# Patient Record
Sex: FEMALE | Race: WHITE | URBAN - METROPOLITAN AREA
[De-identification: names, ages, dates, MRNs, and addresses within clinical notes are randomized per-mention and may not be internally consistent; named-entity substitution may affect disease eponyms.]

---

## 2017-01-18 DIAGNOSIS — E78.5 DYSLIPIDEMIA: ICD-10-CM

## 2017-01-18 RX ORDER — MECOBAL/LEVOMEFOLAT CA/B6 PHOS 2-3-35 MG
TABLET ORAL
Qty: 180 TABLET | Refills: 1 | Status: SHIPPED | OUTPATIENT
Start: 2017-01-18 | End: 2017-04-13 | Stop reason: SDUPTHER

## 2017-04-13 ENCOUNTER — OFFICE VISIT (OUTPATIENT)
Dept: ENDOCRINOLOGY | Age: 57
End: 2017-04-13

## 2017-04-13 VITALS
RESPIRATION RATE: 16 BRPM | BODY MASS INDEX: 31.42 KG/M2 | SYSTOLIC BLOOD PRESSURE: 130 MMHG | DIASTOLIC BLOOD PRESSURE: 68 MMHG | HEIGHT: 61 IN | HEART RATE: 65 BPM | OXYGEN SATURATION: 100 % | WEIGHT: 166.4 LBS

## 2017-04-13 DIAGNOSIS — Z15.89 MTHFR MUTATION: ICD-10-CM

## 2017-04-13 DIAGNOSIS — Z86.718 PERSONAL HISTORY OF DVT (DEEP VEIN THROMBOSIS): ICD-10-CM

## 2017-04-13 DIAGNOSIS — E61.1 IRON DEFICIENCY: ICD-10-CM

## 2017-04-13 DIAGNOSIS — E11.9 TYPE 2 DIABETES MELLITUS WITHOUT COMPLICATION, WITHOUT LONG-TERM CURRENT USE OF INSULIN (HCC): ICD-10-CM

## 2017-04-13 DIAGNOSIS — N95.1 MENOPAUSAL STATE: ICD-10-CM

## 2017-04-13 DIAGNOSIS — E78.5 DYSLIPIDEMIA: ICD-10-CM

## 2017-04-13 DIAGNOSIS — E28.2 PCOS (POLYCYSTIC OVARIAN SYNDROME): Primary | ICD-10-CM

## 2017-04-13 LAB
25-HYDROXYVITAMIN D2 AND D3: 43 NG/ML (ref 30–80)
BASOPHILS ABSOLUTE: 0.1 10*3/UL (ref 0–0.2)
BASOPHILS RELATIVE PERCENT: 0.8 %
C-PEPTIDE: 3.21 NG/ML (ref 0.6–3.9)
CHOLESTEROL, TOTAL: 197 MG/DL (ref 125–199)
CHOLESTEROL/HDL RATIO: 2.5 (ref 0–5)
CORTISOL TOTAL: 6.1 UG/DL
CREATININE URINE: 75.9 MG/DL (ref 47–110)
DHEAS (DHEA SULFATE): 226.3 UG/DL (ref 29.7–182.2)
EOSINOPHILS ABSOLUTE: 0.3 10*3/UL (ref 0–0.5)
EOSINOPHILS RELATIVE PERCENT: 2.7 %
ESTIMATED AVERAGE GLUCOSE: 143 MG/DL (ref 68–114)
ESTRADIOL, SENSITIVE: <10 PG/ML
FERRITIN: 43 NG/ML (ref 10–120)
FOLLICLE STIMULATING HORMONE: 74.2 MIU/ML
GRANULOCYTE ABSOLUTE COUNT: 5.4 10*3/UL (ref 1.5–7.8)
HBA1C MFR BLD: 6.6 % (ref 4–5.6)
HCT VFR BLD CALC: 41.5 % (ref 35–45)
HDLC SERPL-MCNC: 80 MG/DL (ref 40–180)
HEMOGLOBIN: 13.7 G/DL (ref 11.7–15.5)
IRON: 64 UG/DL (ref 50–170)
LDL CHOLESTEROL CALCULATED: 90 MG/DL (ref 0–100)
LYMPHOCYTES ABSOLUTE: 3.3 10*3/UL (ref 0.8–3.9)
LYMPHOCYTES RELATIVE PERCENT: 34.5 %
MCH RBC QN AUTO: 28.5 PG (ref 27–33)
MCHC RBC AUTO-ENTMCNC: 33 G/DL (ref 32–36)
MCV RBC AUTO: 86.4 FL (ref 80–100)
MICROALBUMIN/CREAT 24H UR: 0.3 MG/DL
MICROALBUMIN/CREAT UR-RTO: 4 MG/G CREAT (ref 0–30)
MONOCYTES ABSOLUTE: 0.5 10*3/UL (ref 0.2–0.9)
MONOCYTES RELATIVE PERCENT: 5.3 %
O2 SATURATION: 20 % (ref 15–50)
PDW BLD-RTO: 13.5 % (ref 11–15)
PLATELET # BLD: 403 10*3/UL (ref 140–400)
PMV BLD AUTO: 7.1 FL (ref 7.5–11.5)
RBC # BLD: 4.8 10*6/UL (ref 3.8–5.1)
SEGMENTED NEUTROPHILS RELATIVE PERCENT: 56.7 %
T4 FREE: 1 NG/DL (ref 0.8–1.8)
TOTAL IRON BINDING CAPACITY: 327 UG/DL (ref 250–450)
TRIGL SERPL-MCNC: 134 MG/DL (ref 0–150)
TSH ULTRASENSITIVE: 1.36 UIU/ML (ref 0.35–4.94)
WBC # BLD: 9.6 10*3/UL (ref 3.8–10.8)

## 2017-04-13 PROCEDURE — 99214 OFFICE O/P EST MOD 30 MIN: CPT | Performed by: INTERNAL MEDICINE

## 2017-04-13 RX ORDER — ERGOCALCIFEROL 1.25 MG/1
50000 CAPSULE ORAL WEEKLY
Qty: 12 CAPSULE | Refills: 1 | Status: SHIPPED | OUTPATIENT
Start: 2017-04-13

## 2017-04-13 RX ORDER — METFORMIN HYDROCHLORIDE 750 MG/1
TABLET, EXTENDED RELEASE ORAL
Qty: 90 TABLET | Refills: 2 | Status: SHIPPED | OUTPATIENT
Start: 2017-04-13 | End: 2017-04-21

## 2017-04-13 RX ORDER — ROSUVASTATIN CALCIUM 5 MG
5 TABLET ORAL DAILY
Qty: 90 TABLET | Refills: 2 | Status: SHIPPED | OUTPATIENT
Start: 2017-04-13 | End: 2017-04-13 | Stop reason: SDUPTHER

## 2017-04-13 RX ORDER — ROSUVASTATIN CALCIUM 5 MG
5 TABLET ORAL DAILY
Qty: 90 TABLET | Refills: 2 | Status: SHIPPED | OUTPATIENT
Start: 2017-04-13 | End: 2017-04-20 | Stop reason: SDUPTHER

## 2017-04-13 RX ORDER — MECOBAL/LEVOMEFOLAT CA/B6 PHOS 2-3-35 MG
TABLET ORAL
Qty: 180 TABLET | Refills: 1 | Status: SHIPPED | OUTPATIENT
Start: 2017-04-13

## 2017-04-13 ASSESSMENT — PATIENT HEALTH QUESTIONNAIRE - PHQ9
2. FEELING DOWN, DEPRESSED OR HOPELESS: 1
SUM OF ALL RESPONSES TO PHQ9 QUESTIONS 1 & 2: 1
SUM OF ALL RESPONSES TO PHQ QUESTIONS 1-9: 1

## 2017-04-14 ENCOUNTER — TELEPHONE (OUTPATIENT)
Dept: ENDOCRINOLOGY | Age: 57
End: 2017-04-14

## 2017-04-17 LAB
TESTOSTERONE FREE-MALE: 3.6 PG/ML (ref 0.1–6.4)
TESTOSTERONE TOTAL-MALE: 22 NG/DL (ref 2–45)

## 2017-04-19 LAB — ANDROSTENEDIONE: 61 NG/DL

## 2017-04-20 RX ORDER — ROSUVASTATIN CALCIUM 5 MG
5 TABLET ORAL DAILY
Qty: 90 TABLET | Refills: 1 | Status: SHIPPED | OUTPATIENT
Start: 2017-04-20

## 2017-04-21 ENCOUNTER — TELEPHONE (OUTPATIENT)
Dept: ENDOCRINOLOGY | Age: 57
End: 2017-04-21

## 2017-04-21 RX ORDER — METFORMIN HYDROCHLORIDE 750 MG/1
TABLET, EXTENDED RELEASE ORAL
Qty: 90 TABLET | Refills: 3 | Status: SHIPPED | OUTPATIENT
Start: 2017-04-21

## 2017-04-24 ENCOUNTER — TELEPHONE (OUTPATIENT)
Dept: ENDOCRINOLOGY | Age: 57
End: 2017-04-24

## 2017-11-15 ENCOUNTER — TELEPHONE (OUTPATIENT)
Dept: ENDOCRINOLOGY | Age: 57
End: 2017-11-15

## 2017-11-15 NOTE — TELEPHONE ENCOUNTER
Sirena Olguin with the [facility] called to request medical records for up coming appointment. Last two office notes, lab results and medication list faxed to number provided.